# Patient Record
Sex: FEMALE | URBAN - METROPOLITAN AREA
[De-identification: names, ages, dates, MRNs, and addresses within clinical notes are randomized per-mention and may not be internally consistent; named-entity substitution may affect disease eponyms.]

---

## 2017-08-01 ENCOUNTER — APPOINTMENT (RX ONLY)
Dept: URBAN - METROPOLITAN AREA CLINIC 15 | Facility: CLINIC | Age: 42
Setting detail: DERMATOLOGY
End: 2017-08-01

## 2017-08-01 DIAGNOSIS — L81.1 CHLOASMA: ICD-10-CM

## 2017-08-01 DIAGNOSIS — Z41.9 ENCOUNTER FOR PROCEDURE FOR PURPOSES OTHER THAN REMEDYING HEALTH STATE, UNSPECIFIED: ICD-10-CM

## 2017-08-01 DIAGNOSIS — D22 MELANOCYTIC NEVI: ICD-10-CM

## 2017-08-01 PROBLEM — L70.0 ACNE VULGARIS: Status: ACTIVE | Noted: 2017-08-01

## 2017-08-01 PROBLEM — D48.5 NEOPLASM OF UNCERTAIN BEHAVIOR OF SKIN: Status: ACTIVE | Noted: 2017-08-01

## 2017-08-01 PROBLEM — D22.5 MELANOCYTIC NEVI OF TRUNK: Status: ACTIVE | Noted: 2017-08-01

## 2017-08-01 PROCEDURE — ? RECOMMENDATIONS

## 2017-08-01 PROCEDURE — ? MEDICAL CONSULTATION: DYNAMIC RHYTIDES

## 2017-08-01 PROCEDURE — ? MEDICAL CONSULTATION: FILLERS

## 2017-08-01 PROCEDURE — ? TREATMENT REGIMEN

## 2017-08-01 PROCEDURE — 99214 OFFICE O/P EST MOD 30 MIN: CPT

## 2017-08-01 PROCEDURE — ? ADDITIONAL NOTES

## 2017-08-01 PROCEDURE — ? PATIENT SPECIFIC COUNSELING

## 2017-08-01 PROCEDURE — ? MEDICAL CONSULTATION: LASER RESURFACING

## 2017-08-01 PROCEDURE — ? COUNSELING

## 2017-08-01 ASSESSMENT — LOCATION DETAILED DESCRIPTION DERM
LOCATION DETAILED: LEFT MEDIAL FOREHEAD
LOCATION DETAILED: RIGHT SUPERIOR MEDIAL BUCCAL CHEEK
LOCATION DETAILED: RIGHT CENTRAL MALAR CHEEK
LOCATION DETAILED: LEFT INFERIOR CENTRAL MALAR CHEEK
LOCATION DETAILED: LEFT BUTTOCK
LOCATION DETAILED: LEFT FOREHEAD
LOCATION DETAILED: LEFT CENTRAL MALAR CHEEK
LOCATION DETAILED: RIGHT INFERIOR CENTRAL MALAR CHEEK
LOCATION DETAILED: RIGHT LATERAL FOREHEAD
LOCATION DETAILED: LEFT SUPERIOR MEDIAL BUCCAL CHEEK
LOCATION DETAILED: INFERIOR MID FOREHEAD

## 2017-08-01 ASSESSMENT — LOCATION SIMPLE DESCRIPTION DERM
LOCATION SIMPLE: RIGHT FOREHEAD
LOCATION SIMPLE: INFERIOR FOREHEAD
LOCATION SIMPLE: LEFT CHEEK
LOCATION SIMPLE: LEFT FOREHEAD
LOCATION SIMPLE: LEFT BUTTOCK
LOCATION SIMPLE: RIGHT CHEEK

## 2017-08-01 ASSESSMENT — LOCATION ZONE DERM
LOCATION ZONE: FACE
LOCATION ZONE: TRUNK

## 2017-08-01 NOTE — PROCEDURE: ADDITIONAL NOTES
Additional Notes: Radiesse $750\\nJuvederm Ultra $650\\nFraxel Dual full face $950 per session\\nBotox forehead $350\\nBotox 3 areas $680

## 2017-08-01 NOTE — PROCEDURE: PATIENT SPECIFIC COUNSELING
Discussed the importance of ABCDE's of Melanoma.
Detail Level: Simple
Discussed the importance use of sunscreen to prevent the condition to not get worse. Will plan Hydroquinone treatment the end of September. Also, discussed about chemical peels and Fraxel laser.
Detail Level: Zone

## 2017-08-01 NOTE — HPI: COSMETIC CONSULTATION
When Outside In The Sun, Do You...: mostly tans, rarely burns
Additional History: She would like information and pricing.

## 2017-08-01 NOTE — PROCEDURE: RECOMMENDATIONS
Detail Level: Zone
Recommendation Preamble: The following recommendations were made during the visit:\\nVitalize Peel $130\\nRejuvenize Peel $200\\nFraxel Dual Laser full face $950 per session

## 2017-08-01 NOTE — HPI: DISCOLORATION (MELASMA)
How Severe Are They?: mild
Is This A New Presentation, Or A Follow-Up?: Follow Up Melasma
Additional History: Pt is using regular creams, she is here for evaluation today. \\n\\nFBSE

## 2017-08-01 NOTE — PROCEDURE: REASSURANCE
Additional Note: Completely benign. No further treatment is needed.
Include Location In Plan?: Yes
Detail Level: Simple

## 2017-08-01 NOTE — PROCEDURE: TREATMENT REGIMEN
Continue Regimen: Spf daily zinc oxide / titanium dioxide highly recommended to use daily
Plan: Advanced skin brightening 6% Cream the end of September x one month then add Retinol to mix with Hydroquinone x 2 months. \\nThen plan Fraxel laser vs chemical peel in January
Detail Level: Zone

## 2018-02-02 ENCOUNTER — APPOINTMENT (RX ONLY)
Dept: URBAN - METROPOLITAN AREA CLINIC 15 | Facility: CLINIC | Age: 43
Setting detail: DERMATOLOGY
End: 2018-02-02

## 2018-02-02 DIAGNOSIS — L85.3 XEROSIS CUTIS: ICD-10-CM

## 2018-02-02 DIAGNOSIS — L72.0 EPIDERMAL CYST: ICD-10-CM

## 2018-02-02 DIAGNOSIS — L81.4 OTHER MELANIN HYPERPIGMENTATION: ICD-10-CM

## 2018-02-02 DIAGNOSIS — L81.1 CHLOASMA: ICD-10-CM

## 2018-02-02 PROBLEM — D23.72 OTHER BENIGN NEOPLASM OF SKIN OF LEFT LOWER LIMB, INCLUDING HIP: Status: ACTIVE | Noted: 2018-02-02

## 2018-02-02 PROBLEM — D23.71 OTHER BENIGN NEOPLASM OF SKIN OF RIGHT LOWER LIMB, INCLUDING HIP: Status: ACTIVE | Noted: 2018-02-02

## 2018-02-02 PROCEDURE — 11900 INJECT SKIN LESIONS </W 7: CPT

## 2018-02-02 PROCEDURE — ? COUNSELING

## 2018-02-02 PROCEDURE — ? INTRALESIONAL KENALOG

## 2018-02-02 PROCEDURE — 99213 OFFICE O/P EST LOW 20 MIN: CPT | Mod: 25

## 2018-02-02 PROCEDURE — ? TREATMENT REGIMEN

## 2018-02-02 ASSESSMENT — LOCATION DETAILED DESCRIPTION DERM
LOCATION DETAILED: RIGHT PROXIMAL PRETIBIAL REGION
LOCATION DETAILED: RIGHT POSTERIOR SHOULDER
LOCATION DETAILED: LEFT POSTERIOR SHOULDER
LOCATION DETAILED: RIGHT SUPERIOR PREAURICULAR CHEEK
LOCATION DETAILED: RIGHT PROXIMAL DORSAL FOREARM
LOCATION DETAILED: LEFT PROXIMAL PRETIBIAL REGION
LOCATION DETAILED: RIGHT INFERIOR CENTRAL MALAR CHEEK
LOCATION DETAILED: LEFT PROXIMAL DORSAL FOREARM
LOCATION DETAILED: SUPERIOR THORACIC SPINE
LOCATION DETAILED: LEFT INFERIOR CENTRAL MALAR CHEEK

## 2018-02-02 ASSESSMENT — LOCATION SIMPLE DESCRIPTION DERM
LOCATION SIMPLE: RIGHT FOREARM
LOCATION SIMPLE: UPPER BACK
LOCATION SIMPLE: RIGHT SHOULDER
LOCATION SIMPLE: RIGHT PRETIBIAL REGION
LOCATION SIMPLE: LEFT PRETIBIAL REGION
LOCATION SIMPLE: RIGHT CHEEK
LOCATION SIMPLE: LEFT CHEEK
LOCATION SIMPLE: LEFT FOREARM
LOCATION SIMPLE: LEFT SHOULDER

## 2018-02-02 ASSESSMENT — LOCATION ZONE DERM
LOCATION ZONE: FACE
LOCATION ZONE: LEG
LOCATION ZONE: ARM
LOCATION ZONE: TRUNK

## 2018-02-02 ASSESSMENT — SEVERITY ASSESSMENT: SEVERITY: MILD, SLIGHTLY DARKER THAN THE SURROUNDING NORMAL SKIN

## 2018-02-02 NOTE — PROCEDURE: TREATMENT REGIMEN
Detail Level: Zone
Otc Regimen: SPF daily that has the zinc oxide and titanium dioxide
Otc Regimen: CeraVe Cream once daily after showers
Initiate Treatment: Vivatia kit to be used once at night on Monday through Friday\\nSPF daily that has the zinc oxide and the titanium dioxide

## 2018-02-02 NOTE — PROCEDURE: REASSURANCE
Additional Notes (Optional): Completely benign. Treated with Electrodessication today as a courtesy.
Detail Level: Simple

## 2018-02-02 NOTE — PROCEDURE: INTRALESIONAL KENALOG
Administered By (Optional): Jean Pierre
Medical Necessity Clause: This procedure was medically necessary because the lesions that were treated were:
Concentration Of Solution Injected (Mg/Ml): 2.5
Lot # (Optional): [id number]
Consent: The risks of atrophy were reviewed with the patient.
X Size Of Lesion In Cm (Optional): 0
Detail Level: Simple
Total Volume Injected (Ccs- Only Use Numbers And Decimals): 0.3
Include Z78.9 (Other Specified Conditions Influencing Health Status) As An Associated Diagnosis?: Yes
Expiration Date (Optional): 1/2019
Kenalog Preparation: Kenalog